# Patient Record
Sex: MALE | Race: ASIAN | NOT HISPANIC OR LATINO | Employment: FULL TIME | URBAN - METROPOLITAN AREA
[De-identification: names, ages, dates, MRNs, and addresses within clinical notes are randomized per-mention and may not be internally consistent; named-entity substitution may affect disease eponyms.]

---

## 2023-05-21 ENCOUNTER — HOSPITAL ENCOUNTER (EMERGENCY)
Facility: CLINIC | Age: 53
Discharge: HOME | End: 2023-05-21
Attending: FAMILY MEDICINE | Admitting: FAMILY MEDICINE
Payer: COMMERCIAL

## 2023-05-21 ENCOUNTER — APPOINTMENT (OUTPATIENT)
Dept: CT IMAGING | Facility: CLINIC | Age: 53
End: 2023-05-21
Attending: FAMILY MEDICINE
Payer: COMMERCIAL

## 2023-05-21 VITALS
SYSTOLIC BLOOD PRESSURE: 133 MMHG | BODY MASS INDEX: 23.92 KG/M2 | HEIGHT: 63 IN | WEIGHT: 135 LBS | TEMPERATURE: 97.8 F | OXYGEN SATURATION: 99 % | HEART RATE: 101 BPM | DIASTOLIC BLOOD PRESSURE: 87 MMHG

## 2023-05-21 DIAGNOSIS — N20.1 URETEROLITHIASIS: ICD-10-CM

## 2023-05-21 DIAGNOSIS — N23 RENAL COLIC: ICD-10-CM

## 2023-05-21 LAB
ALBUMIN SERPL BCG-MCNC: 4.8 G/DL (ref 3.5–5.2)
ALBUMIN UR-MCNC: 30 MG/DL
ALP SERPL-CCNC: 73 U/L (ref 40–129)
ALT SERPL W P-5'-P-CCNC: 28 U/L (ref 10–50)
ANION GAP SERPL CALCULATED.3IONS-SCNC: 16 MMOL/L (ref 7–15)
APPEARANCE UR: CLEAR
AST SERPL W P-5'-P-CCNC: 27 U/L (ref 10–50)
BASOPHILS # BLD AUTO: 0.1 10E3/UL (ref 0–0.2)
BASOPHILS NFR BLD AUTO: 1 %
BILIRUB SERPL-MCNC: 1.1 MG/DL
BILIRUB UR QL STRIP: NEGATIVE
BUN SERPL-MCNC: 18.9 MG/DL (ref 6–20)
CALCIUM SERPL-MCNC: 9.6 MG/DL (ref 8.6–10)
CHLORIDE SERPL-SCNC: 104 MMOL/L (ref 98–107)
COLOR UR AUTO: YELLOW
CREAT SERPL-MCNC: 1.65 MG/DL (ref 0.67–1.17)
DEPRECATED HCO3 PLAS-SCNC: 21 MMOL/L (ref 22–29)
EOSINOPHIL # BLD AUTO: 0.2 10E3/UL (ref 0–0.7)
EOSINOPHIL NFR BLD AUTO: 1 %
ERYTHROCYTE [DISTWIDTH] IN BLOOD BY AUTOMATED COUNT: 13.2 % (ref 10–15)
GFR SERPL CREATININE-BSD FRML MDRD: 49 ML/MIN/1.73M2
GLUCOSE SERPL-MCNC: 91 MG/DL (ref 70–99)
GLUCOSE UR STRIP-MCNC: NEGATIVE MG/DL
HCT VFR BLD AUTO: 48.7 % (ref 40–53)
HGB BLD-MCNC: 16.6 G/DL (ref 13.3–17.7)
HGB UR QL STRIP: ABNORMAL
HOLD SPECIMEN: NORMAL
IMM GRANULOCYTES # BLD: 0 10E3/UL
IMM GRANULOCYTES NFR BLD: 0 %
KETONES UR STRIP-MCNC: 20 MG/DL
LEUKOCYTE ESTERASE UR QL STRIP: NEGATIVE
LIPASE SERPL-CCNC: 26 U/L (ref 13–60)
LYMPHOCYTES # BLD AUTO: 2.9 10E3/UL (ref 0.8–5.3)
LYMPHOCYTES NFR BLD AUTO: 21 %
MCH RBC QN AUTO: 29.7 PG (ref 26.5–33)
MCHC RBC AUTO-ENTMCNC: 34.1 G/DL (ref 31.5–36.5)
MCV RBC AUTO: 87 FL (ref 78–100)
MONOCYTES # BLD AUTO: 0.6 10E3/UL (ref 0–1.3)
MONOCYTES NFR BLD AUTO: 4 %
MUCOUS THREADS #/AREA URNS LPF: PRESENT /LPF
NEUTROPHILS # BLD AUTO: 10.3 10E3/UL (ref 1.6–8.3)
NEUTROPHILS NFR BLD AUTO: 73 %
NITRATE UR QL: NEGATIVE
NRBC # BLD AUTO: 0 10E3/UL
NRBC BLD AUTO-RTO: 0 /100
PH UR STRIP: 5 [PH] (ref 5–7)
PLATELET # BLD AUTO: 235 10E3/UL (ref 150–450)
POTASSIUM SERPL-SCNC: 4.2 MMOL/L (ref 3.4–5.3)
PROT SERPL-MCNC: 8.2 G/DL (ref 6.4–8.3)
RBC # BLD AUTO: 5.59 10E6/UL (ref 4.4–5.9)
RBC URINE: >182 /HPF
SODIUM SERPL-SCNC: 141 MMOL/L (ref 136–145)
SP GR UR STRIP: 1.02 (ref 1–1.03)
UROBILINOGEN UR STRIP-MCNC: NORMAL MG/DL
WBC # BLD AUTO: 14 10E3/UL (ref 4–11)
WBC URINE: 1 /HPF

## 2023-05-21 PROCEDURE — 99285 EMERGENCY DEPT VISIT HI MDM: CPT | Mod: 25 | Performed by: FAMILY MEDICINE

## 2023-05-21 PROCEDURE — 258N000003 HC RX IP 258 OP 636: Performed by: FAMILY MEDICINE

## 2023-05-21 PROCEDURE — 250N000011 HC RX IP 250 OP 636: Performed by: FAMILY MEDICINE

## 2023-05-21 PROCEDURE — 81001 URINALYSIS AUTO W/SCOPE: CPT | Performed by: FAMILY MEDICINE

## 2023-05-21 PROCEDURE — 83690 ASSAY OF LIPASE: CPT | Performed by: FAMILY MEDICINE

## 2023-05-21 PROCEDURE — 36415 COLL VENOUS BLD VENIPUNCTURE: CPT | Performed by: FAMILY MEDICINE

## 2023-05-21 PROCEDURE — 96374 THER/PROPH/DIAG INJ IV PUSH: CPT | Mod: 59 | Performed by: FAMILY MEDICINE

## 2023-05-21 PROCEDURE — 74177 CT ABD & PELVIS W/CONTRAST: CPT

## 2023-05-21 PROCEDURE — 85025 COMPLETE CBC W/AUTO DIFF WBC: CPT | Performed by: FAMILY MEDICINE

## 2023-05-21 PROCEDURE — 250N000009 HC RX 250: Performed by: FAMILY MEDICINE

## 2023-05-21 PROCEDURE — 99284 EMERGENCY DEPT VISIT MOD MDM: CPT | Performed by: FAMILY MEDICINE

## 2023-05-21 PROCEDURE — 250N000013 HC RX MED GY IP 250 OP 250 PS 637: Performed by: FAMILY MEDICINE

## 2023-05-21 PROCEDURE — 80053 COMPREHEN METABOLIC PANEL: CPT | Performed by: FAMILY MEDICINE

## 2023-05-21 PROCEDURE — 96361 HYDRATE IV INFUSION ADD-ON: CPT | Performed by: FAMILY MEDICINE

## 2023-05-21 RX ORDER — IOPAMIDOL 755 MG/ML
60 INJECTION, SOLUTION INTRAVASCULAR ONCE
Status: COMPLETED | OUTPATIENT
Start: 2023-05-21 | End: 2023-05-21

## 2023-05-21 RX ORDER — TAMSULOSIN HYDROCHLORIDE 0.4 MG/1
0.4 CAPSULE ORAL ONCE
Status: COMPLETED | OUTPATIENT
Start: 2023-05-21 | End: 2023-05-21

## 2023-05-21 RX ORDER — TAMSULOSIN HYDROCHLORIDE 0.4 MG/1
0.4 CAPSULE ORAL DAILY
Status: DISCONTINUED | OUTPATIENT
Start: 2023-05-21 | End: 2023-05-21

## 2023-05-21 RX ORDER — TAMSULOSIN HYDROCHLORIDE 0.4 MG/1
0.4 CAPSULE ORAL DAILY
Qty: 10 CAPSULE | Refills: 0 | Status: SHIPPED | OUTPATIENT
Start: 2023-05-21 | End: 2023-05-21

## 2023-05-21 RX ORDER — OXYCODONE HYDROCHLORIDE 5 MG/1
5-10 TABLET ORAL EVERY 6 HOURS PRN
Qty: 12 TABLET | Refills: 0 | Status: SHIPPED | OUTPATIENT
Start: 2023-05-21

## 2023-05-21 RX ORDER — KETOROLAC TROMETHAMINE 15 MG/ML
15 INJECTION, SOLUTION INTRAMUSCULAR; INTRAVENOUS ONCE
Status: COMPLETED | OUTPATIENT
Start: 2023-05-21 | End: 2023-05-21

## 2023-05-21 RX ORDER — TAMSULOSIN HYDROCHLORIDE 0.4 MG/1
0.4 CAPSULE ORAL DAILY
Qty: 10 CAPSULE | Refills: 0 | Status: SHIPPED | OUTPATIENT
Start: 2023-05-21

## 2023-05-21 RX ORDER — OXYCODONE HYDROCHLORIDE 5 MG/1
5-10 TABLET ORAL EVERY 6 HOURS PRN
Qty: 12 TABLET | Refills: 0 | Status: SHIPPED | OUTPATIENT
Start: 2023-05-21 | End: 2023-05-21

## 2023-05-21 RX ADMIN — IOPAMIDOL 60 ML: 755 INJECTION, SOLUTION INTRAVENOUS at 14:43

## 2023-05-21 RX ADMIN — KETOROLAC TROMETHAMINE 15 MG: 15 INJECTION, SOLUTION INTRAMUSCULAR; INTRAVENOUS at 13:33

## 2023-05-21 RX ADMIN — SODIUM CHLORIDE, POTASSIUM CHLORIDE, SODIUM LACTATE AND CALCIUM CHLORIDE 1000 ML: 600; 310; 30; 20 INJECTION, SOLUTION INTRAVENOUS at 15:45

## 2023-05-21 RX ADMIN — SODIUM CHLORIDE 56 ML: 9 INJECTION, SOLUTION INTRAVENOUS at 14:43

## 2023-05-21 RX ADMIN — TAMSULOSIN HYDROCHLORIDE 0.4 MG: 0.4 CAPSULE ORAL at 15:46

## 2023-05-21 ASSESSMENT — ACTIVITIES OF DAILY LIVING (ADL)
ADLS_ACUITY_SCORE: 35

## 2023-05-21 NOTE — DISCHARGE INSTRUCTIONS
Take tamsulosin, 0.4 mg, once daily until you pass the stone.  This will help you pass the stone.  Do not start it until tomorrow. You have already had a dose today.  Take ibuprofen, 400 mg, 4 times per day if needed for pain.  You may add acetaminophen 1000 mg 4 times per day if needed for pain.    You may add oxycodone 5 mg, 1-2 tablets up to every 6 hours if needed for pain.  Try to use this primarily only at night to help with sleep.    Do not use alcohol, operate machinery, drive, or climb on ladders, or perform other complex motor activity or make important decisions for 8 hours after taking oxycodone. Use docusate (100mg) 2 times a day to prevent constipation while on narcotics.  Return to be seen if you cannot manage her pain, have vomiting, fevers greater than 101, or other new concerning symptoms.  If you do not pass the stone by the middle of the week, follow-up in urology.

## 2023-05-21 NOTE — ED PROVIDER NOTES
"  History     Chief Complaint   Patient presents with     Abdominal Pain     HPI     Frederick Pires is a 53 year old male who comes in with his family from home with right lower quadrant abdominal pain.  [place] [facility] [place]  He reports that 4 days ago he developed right lower quadrant abdominal pain but that the symptoms abated until this morning at 10 AM when they recurred.  Pain radiates into the right flank.  It makes him nauseated but he has not vomited.  It does not seem to be aggravated by movement.  He has not noticed hematuria, dysuria, increased urinary frequency.  He feels chilled but has not taken his temperature.  He has had normal bowel movements.  He is taken some Tylenol for it and some Tums with no relief.  He has no identified chronic medical problems but has not been to a doctor in many years.  He takes no prescription medications.  He has no known drug allergies.  He has not had this pain previously.  He says he is never had any surgeries on his abdomen including no appendix or gallbladder surgeries.  He is not having any shortness of breath, chest pain, or URI symptoms.  He does not smoke.    Allergies:  No Known Allergies    Problem List:    There are no problems to display for this patient.       Past Medical History:    No past medical history on file.    Past Surgical History:    No past surgical history on file.    Family History:    No family history on file.    Social History:  Marital Status:   [2]        Medications:    oxyCODONE (ROXICODONE) 5 MG tablet  tamsulosin (FLOMAX) 0.4 MG capsule          Review of Systems  All other systems are reviewed and are negative    Physical Exam   BP: (!) 159/100  Pulse: 82  Temp: 97.8  F (36.6  C)  Height: 160 cm (5' 3\")  Weight: 61.2 kg (135 lb)  SpO2: 99 %      Physical Exam     Nursing note and vitals were reviewed.  Constitutional: Awake and alert, adequately nourished and developed " appearing 53-year-old in moderate discomfort, who does not appear acutely ill, and who answers questions appropriately and cooperates with examination.  HEENT: EOMI.   Neck: Freely mobile.  Cardiovascular: Cardiac examination reveals normal heart rate and regular rhythm without murmur.  Pulmonary/Chest: Breathing is unlabored.  Breath sounds are clear and equal bilaterally.  There no retractions, tachypnea, rales, wheezes, or rhonchi.  Abdomen: Soft, tender in the right lower quadrant no tenderness elsewhere no referred tenderness from the other quadrants, no HSM or masses rebound or guarding.  No CVA tenderness to percussion  Musculoskeletal: Extremities are warm and well-perfused and without edema  Neurological: Alert, oriented, thought content logical, coherent   Skin: Warm, dry, no rashes.  Psychiatric: Affect congruent with acute pain.      ED Course                 Procedures              Critical Care time:  none               Results for orders placed or performed during the hospital encounter of 05/21/23 (from the past 24 hour(s))   [facility] Draw    Narrative    The following orders were created for panel order [place] Draw.  Procedure                               Abnormality         Status                     ---------                               -----------         ------                     Extra Blue Top Tube[[id number]]                              Final result               Extra Red Top Tube[[id number]]                               Final result               Extra Green Top (Lithium...[[id number]]                      Final result               Extra Purple Top Tube[[id number]]                            Final result                 Please view results for these tests on the individual orders.   Extra Blue Top Tube   Result Value Ref Range    Hold Specimen JIC    Extra Red Top Tube   Result Value Ref Range    Hold Specimen JIC    Extra Green Top (Lithium Heparin) Tube   Result Value Ref Range    Hold  Specimen JIC    Extra Purple Top Tube   Result Value Ref Range    Hold Specimen JI    CBC with platelets differential    Narrative    The following orders were created for panel order CBC with platelets differential.  Procedure                               Abnormality         Status                     ---------                               -----------         ------                     CBC with platelets and d...[[id number]]  Abnormal            Final result                 Please view results for these tests on the individual orders.   Comprehensive metabolic panel   Result Value Ref Range    Sodium 141 136 - 145 mmol/L    Potassium 4.2 3.4 - 5.3 mmol/L    Chloride 104 98 - 107 mmol/L    Carbon Dioxide (CO2) 21 (L) 22 - 29 mmol/L    Anion Gap 16 (H) 7 - 15 mmol/L    Urea Nitrogen 18.9 6.0 - 20.0 mg/dL    Creatinine 1.65 (H) 0.67 - 1.17 mg/dL    Calcium 9.6 8.6 - 10.0 mg/dL    Glucose 91 70 - 99 mg/dL    Alkaline Phosphatase 73 40 - 129 U/L    AST 27 10 - 50 U/L    ALT 28 10 - 50 U/L    Protein Total 8.2 6.4 - 8.3 g/dL    Albumin 4.8 3.5 - 5.2 g/dL    Bilirubin Total 1.1 <=1.2 mg/dL    GFR Estimate 49 (L) >60 mL/min/1.73m2   Lipase   Result Value Ref Range    Lipase 26 13 - 60 U/L   CBC with platelets and differential   Result Value Ref Range    WBC Count 14.0 (H) 4.0 - 11.0 10e3/uL    RBC Count 5.59 4.40 - 5.90 10e6/uL    Hemoglobin 16.6 13.3 - 17.7 g/dL    Hematocrit 48.7 40.0 - 53.0 %    MCV 87 78 - 100 fL    MCH 29.7 26.5 - 33.0 pg    MCHC 34.1 31.5 - 36.5 g/dL    RDW 13.2 10.0 - 15.0 %    Platelet Count 235 150 - 450 10e3/uL    % Neutrophils 73 %    % Lymphocytes 21 %    % Monocytes 4 %    % Eosinophils 1 %    % Basophils 1 %    % Immature Granulocytes 0 %    NRBCs per 100 WBC 0 <1 /100    Absolute Neutrophils 10.3 (H) 1.6 - 8.3 10e3/uL    Absolute Lymphocytes 2.9 0.8 - 5.3 10e3/uL    Absolute Monocytes 0.6 0.0 - 1.3 10e3/uL    Absolute Eosinophils 0.2 0.0 - 0.7 10e3/uL    Absolute Basophils 0.1 0.0 - 0.2  10e3/uL    Absolute Immature Granulocytes 0.0 <=0.4 10e3/uL    Absolute NRBCs 0.0 10e3/uL   CT Abdomen Pelvis w Contrast    Narrative    EXAM: CT ABDOMEN PELVIS W CONTRAST  LOCATION: [facility]  DATE/TIME: 5/21/2023 2:56 PM CDT    INDICATION: Right lower quadrant abdominal pain.  COMPARISON: None.  TECHNIQUE: CT scan of the abdomen and pelvis was performed following injection of IV contrast. Multiplanar reformats were obtained. Dose reduction techniques were used.  CONTRAST: 60mL isovue 370    FINDINGS:   LOWER CHEST: Dependent atelectasis.    HEPATOBILIARY: No calcified gallstones. No biliary ductal dilation.    PANCREAS: Normal.    SPLEEN: Normal.    ADRENAL GLANDS: Normal.    KIDNEYS/BLADDER: Obstructing 4 x 2 mm proximal right ureteral calculus resulting in mild right hydronephrosis and a slightly delayed right nephrogram (4/#41). No other urinary calculi. Small benign right upper pole renal cyst, which does not require   follow-up. Normal bladder.    BOWEL: No obstruction or inflammation. Normal appendix.    LYMPH NODES: No enlarged lymph nodes.    VASCULATURE: Patent portal, splenic, and superior mesenteric veins. No abdominal aortic aneurysm.    PELVIC ORGANS: Normal.    MUSCULOSKELETAL: Small fat-containing bilateral inguinal hernias. No acute bony abnormality.      Impression    IMPRESSION:     1.  Obstructing 4 mm proximal right ureteral calculus resulting in mild right hydronephrosis and a delayed right nephrogram.    2.  No other acute abnormality.   UA reflex to Microscopic   Result Value Ref Range    Color Urine Yellow Colorless, Straw, Light Yellow, Yellow    Appearance Urine Clear Clear    Glucose Urine Negative Negative mg/dL    Bilirubin Urine Negative Negative    Ketones Urine 20 (A) Negative mg/dL    Specific Gravity Urine 1.025 1.003 - 1.035    Blood Urine Large (A) Negative    pH Urine 5.0 5.0 - 7.0    Protein Albumin Urine 30 (A) Negative mg/dL    Urobilinogen Urine  Normal Normal, 2.0 mg/dL    Nitrite Urine Negative Negative    Leukocyte Esterase Urine Negative Negative    RBC Urine >182 (H) <=2 /HPF    WBC Urine 1 <=5 /HPF    Mucus Urine Present (A) None Seen /LPF       Medications   ketorolac (TORADOL) injection 15 mg (15 mg Intravenous $Given 5/21/23 1333)   iopamidol (ISOVUE-370) solution 60 mL (60 mLs Intravenous $Given 5/21/23 1443)   sodium chloride 0.9 % bag 500mL for CT scan flush use (56 mLs As instructed $Given 5/21/23 1443)   tamsulosin (FLOMAX) capsule 0.4 mg (0.4 mg Oral $Given 5/21/23 1546)   lactated ringers BOLUS 1,000 mL (0 mLs Intravenous Stopped 5/21/23 1645)       Assessments & Plan (with Medical Decision Making)     53-year-old male presented with right lower quadrant abdominal pain and right flank pain as described above.  Communication was through the [facility] [place]  and through family members.  Abdominal examination revealed some right lower quadrant tenderness but no peritoneal signs.  Laboratory studies were all reassuring with mild leukocytosis which is nonspecific and mildly increased creatinine which were likely chronic.  CT scan of the abdomen pelvis showed a proximal 4 mm right ureteral stone which undoubtedly is the source of his pain.  Mild hydronephrosis was present.  Symptoms improved significantly with medication in the emergency department.  He will be discharged to home management with pain medication and fluids and follow-up in urology if he does not pass the stone.  He was advised to return if fevers develop, vomiting, or he is not able to manage the pain.  He and his family expressed understanding and their questions were answered.    I have reviewed the nursing notes.    I have reviewed the findings, diagnosis, plan and need for follow up with the patient.         Discharge Medication List as of 5/21/2023  6:45 PM          Final diagnoses:   Renal colic   Ureterolithiasis       5/21/2023   [facility] [place]  DEPT     Wade Vivar MD  05/21/23 0252

## 2023-05-21 NOTE — ED TRIAGE NOTES
Pt c/o RLQ pain that radiates to right flank.  No fevers.  Nauseated but no vomiting or diarrhea.  No urinary issues.     Triage Assessment     Row Name 05/21/23 6104       Triage Assessment (Adult)    Airway WDL WDL       Respiratory WDL    Respiratory WDL WDL       Skin Circulation/Temperature WDL    Skin Circulation/Temperature WDL WDL       Cardiac WDL    Cardiac WDL WDL       Peripheral/Neurovascular WDL    Peripheral Neurovascular WDL WDL       Cognitive/Neuro/Behavioral WDL    Cognitive/Neuro/Behavioral WDL WDL

## 2023-05-21 NOTE — ED NOTES
[facility]  ipad used for communication with pt. Pt reports sharp RUQ pain that radiates to his back. He reports nausea as well.     MD at bedside completing assessment.